# Patient Record
Sex: MALE | Race: OTHER | NOT HISPANIC OR LATINO | URBAN - METROPOLITAN AREA
[De-identification: names, ages, dates, MRNs, and addresses within clinical notes are randomized per-mention and may not be internally consistent; named-entity substitution may affect disease eponyms.]

---

## 2023-09-01 ENCOUNTER — EMERGENCY (EMERGENCY)
Facility: HOSPITAL | Age: 23
LOS: 1 days | Discharge: HOME | End: 2023-09-01
Attending: STUDENT IN AN ORGANIZED HEALTH CARE EDUCATION/TRAINING PROGRAM | Admitting: STUDENT IN AN ORGANIZED HEALTH CARE EDUCATION/TRAINING PROGRAM
Payer: COMMERCIAL

## 2023-09-01 VITALS
HEART RATE: 62 BPM | SYSTOLIC BLOOD PRESSURE: 121 MMHG | HEIGHT: 72 IN | OXYGEN SATURATION: 98 % | DIASTOLIC BLOOD PRESSURE: 78 MMHG | RESPIRATION RATE: 15 BRPM | WEIGHT: 177.91 LBS | TEMPERATURE: 98 F

## 2023-09-01 DIAGNOSIS — L30.1 DYSHIDROSIS [POMPHOLYX]: ICD-10-CM

## 2023-09-01 DIAGNOSIS — Z91.018 ALLERGY TO OTHER FOODS: ICD-10-CM

## 2023-09-01 DIAGNOSIS — R21 RASH AND OTHER NONSPECIFIC SKIN ERUPTION: ICD-10-CM

## 2023-09-01 PROCEDURE — 99284 EMERGENCY DEPT VISIT MOD MDM: CPT

## 2023-09-01 PROCEDURE — 99283 EMERGENCY DEPT VISIT LOW MDM: CPT

## 2023-09-01 NOTE — ED ADULT TRIAGE NOTE - CHIEF COMPLAINT QUOTE
"I was told to come to ER because the infection to my left arm is spreading". Patient denies fever, chills, NVD. patient was given clotrimazole cream, bactrim and valacyclovir.

## 2023-09-01 NOTE — ED PROVIDER NOTE - CLINICAL SUMMARY MEDICAL DECISION MAKING FREE TEXT BOX
Likely dyshidrotic eczema. Low susp of infectious etiology. Pt has no history of chickenpox, rash is not painful, low susp of shingles. Lesion non-ttp, low susp of cellulitis. Does not clinically resemble fungal infection.  Prescribed systemic steroids, derm referral.

## 2023-09-01 NOTE — ED ADULT NURSE NOTE - OBJECTIVE STATEMENT
23y otherwise healthy male c/o rash to L arm that started about a week ago. rash is red, oozing and mildly painful. went to [facility] and was given clotrimazole cream, bactrim and valacyclovir without relief. pt well appearing. denies f/c, CP, SOB, HA, dizzines,s n/v/d. No acute distress noted at this time.

## 2023-09-01 NOTE — ED PROVIDER NOTE - NS ED ROS FT
CONSTITUTIONAL: No fever, no chills, no fatigue  EYES: No eye redness, no visual changes  ENT: No ear pain, no sore throat  CARDIOVASCULAR: No chest pain, no palpitations  RESPIRATORY: No cough, no SOB  GI: No abdominal pain, no nausea, no vomiting, no constipation, no diarrhea  GENITOURINARY: No dysuria, no frequency, no hematuria  MUSCULOSKELETAL: No back pain, no joint pain, no myalgias  SKIN: +rash, no peripheral edema  NEURO: No headache, no confusion    ALL OTHER SYSTEMS NEGATIVE.

## 2023-09-01 NOTE — ED PROVIDER NOTE - PATIENT PORTAL LINK FT
You can access the FollowMyHealth Patient Portal offered by [facility] by registering at the following website: [email or web address]. By joining [employer]’s FollowMyHealth portal, you will also be able to view your health information using other applications (apps) compatible with our system.

## 2023-09-01 NOTE — ED PROVIDER NOTE - OBJECTIVE STATEMENT
24 yo M w no PMH p/w LUE and R hand rash for 2 wks. Rash initially began on R 4th finger, now resolving. Several days after initial rash on hand, he developed an area of erythema on the L arm near elbow approx 3cm in maria d. Area is mildly pruritic, not painful. Small eruption of clear vesicles that progressed to honey-crusted lesions surrounding area, further convalesced to widening erythema over the following week. He visited urgent care 2 days ago where the provider did not know what the rash was but prescribed topical antifungal, acyclovir, and bactrim in attempt to cover all possible infectious pathology. Rash has not improved with medication. Currently approx 10 cm in maria d, non-circumferential. He has not had a fever or chills. No history of similar rashes. No allergies or atypical exposures. Rash not bulls-eye, not rim enhancing.

## 2023-09-01 NOTE — ED PROVIDER NOTE - PHYSICAL EXAMINATION
CONSTITUTIONAL: Non-toxic; in no apparent distress  HEAD: Normocephalic; atraumatic  EYES: No icterus or injection  ENMT: External appears normal  NECK: Supple; normal ROM, no visible mass, symmetric, trachea midline  CARD: Extremities warm and well perfused, normal HR, normal BP  RESP: No resp distress, symmetric chest rise  ABD: No obvious ascites or abd distension  EXT: Grossly normal ROM in all four extremities  SKIN: Warm, dry, + L 4th finger with 2cm maria d area of dry mildly erythematous skin that is non-ttp, + RUE elbow w 10cm maria d area of erythema w peripheral vesicles w clear/honey-crusted fluid, no ttp, no fluctuance, no calor  NEURO: Gait normal, grossly symmetric face, strength intact throughout extremities.

## 2023-09-01 NOTE — ED PROVIDER NOTE - NSFOLLOWUPINSTRUCTIONS_ED_ALL_ED_FT
Follow up with your primary medical doctor as soon as possible.    Return to the emergency department if your symptoms worsen or if you develop new symptoms.  If you have any problems with followup, please call the ED Referral Coordinator at [phone].    In clinical experience, the avoidance of irritants or exacerbating factors is beneficial for most patients with dyshidrotic eczema. General skin care measures aimed at reducing skin irritation and restoring the skin barrier include [31]:    ?Using lukewarm water and mild, synthetic detergent (nonsoap) cleansers to wash hands. Most liquid body cleansers are actually synthetic detergent products with a neutral pH. In contrast, "natural" soaps (eg, Castile soap) have an alkaline pH and tend to be more aggressive detergents.      ?Drying hands thoroughly after washing.      ?Applying emollients (eg, petroleum jelly) immediately after hand drying and as often as possible.      ?Wearing cotton gloves under vinyl or other nonlatex gloves when performing wet work.      ?Removing rings, watches, and bracelets before wet work.      ?Wearing protective gloves in cold weather.      ?Wearing task-specific gloves for frictional exposures (eg, gardening, carpentry).      ?Avoiding cutaneous exposure to irritants (eg, harsh detergents, solvents, hair dyes, acidic foods [eg, citrus fruit]).      In clinical practice, astringent solutions such as aluminum subacetate (Burow's solution) or witch hazel are used for wet, weeping skin. Hands or feet are soaked in the solution for 15 minutes two to four times per day. Alternately, soakings can be replaced by thin, wet dressings through which air will circulate, enhancing the drying effect of astringent solutions. Large bullae may be drained or aspirated using a sterile syringe to reduce pain and prevent spontaneous rupture with risk of local infection. Follow up with your primary medical doctor as soon as possible.  Follow up with dermatology - Call [phone] to schedule an appointment.    Return to the emergency department if your symptoms worsen or if you develop new symptoms.  If you have any problems with followup, please call the ED Referral Coordinator at [phone].    In clinical experience, the avoidance of irritants or exacerbating factors is beneficial for most patients with dyshidrotic eczema. General skin care measures aimed at reducing skin irritation and restoring the skin barrier include [31]:    ?Using lukewarm water and mild, synthetic detergent (nonsoap) cleansers to wash hands. Most liquid body cleansers are actually synthetic detergent products with a neutral pH. In contrast, "natural" soaps (eg, Castile soap) have an alkaline pH and tend to be more aggressive detergents.      ?Drying hands thoroughly after washing.      ?Applying emollients (eg, petroleum jelly) immediately after hand drying and as often as possible.      ?Wearing cotton gloves under vinyl or other nonlatex gloves when performing wet work.      ?Removing rings, watches, and bracelets before wet work.      ?Wearing protective gloves in cold weather.      ?Wearing task-specific gloves for frictional exposures (eg, gardening, carpentry).      ?Avoiding cutaneous exposure to irritants (eg, harsh detergents, solvents, hair dyes, acidic foods [eg, citrus fruit]).      In clinical practice, astringent solutions such as aluminum subacetate (Burow's solution) or witch hazel are used for wet, weeping skin. Hands or feet are soaked in the solution for 15 minutes two to four times per day. Alternately, soakings can be replaced by thin, wet dressings through which air will circulate, enhancing the drying effect of astringent solutions. Large bullae may be drained or aspirated using a sterile syringe to reduce pain and prevent spontaneous rupture with risk of local infection.

## 2024-04-01 NOTE — ED ADULT TRIAGE NOTE - ARRIVAL FROM
HISTORY & PHYSICAL    Subjective   Chief complaint: Jesus Lantigua is a 81 y.o. male who is being seen and evaluated for multiple medical problems.  Patient admitted to SNF for therapy due to weakness after recent hospitalization.    HPI:  HPI  Patient was admitted to the hospital for UTI.  Patient was followed by urology.  Patient underwent cystoscopy, bilateral retrograde pyelogram on 3/25/2024.  Patient is to follow-up outpatient with urology and is to continue with Ashley catheter.  Patient had a concern for acute convulsion in hospital and was followed by neuro, patient does have a history of Parkinson's disease.  Neurology recommended continued medications as currently ordered.  Patient was hemodynamically stable to discharge to SNF for continued medical management and therapy.  Patient was seen and examined at bedside, appears to be in no acute distress.  Afebrile.  Patient is tolerating antibiotic for UTI without adverse effects.  Past Medical History:   Diagnosis Date    Atherosclerotic heart disease of native coronary artery without angina pectoris     Chronic kidney disease, stage 1     Cognitive communication deficit     Dementia (CMS/HCC)     Essential (primary) hypertension     Hyperlipidemia     Parkinson's disease (CMS/[facility])     Type 2 diabetes mellitus without complication (CMS/[facility])     Urinary tract infection        Past Surgical History:   Procedure Laterality Date    APPENDECTOMY  11/14/2016    Appendectomy    OTHER SURGICAL HISTORY  11/14/2016    Cath Stent Placement Number Of Stents Placed:       Family History   Problem Relation Name Age of Onset    No Known Problems Mother      No Known Problems Father         Social History     Socioeconomic History    Marital status:      Spouse name: Not on file    Number of children: Not on file    Years of education: Not on file    Highest education level: Not on file   Occupational History    Not on file   Tobacco Use    Smoking status: Not on file     Smokeless tobacco: Not on file   Substance and Sexual Activity    Alcohol use: Not on file    Drug use: Not on file    Sexual activity: Not on file   Other Topics Concern    Not on file   Social History Narrative    Not on file     Social Determinants of Health     Financial Resource Strain: Not on file   Food Insecurity: Not on file   Transportation Needs: Not on file   Physical Activity: Not on file   Stress: Not on file   Social Connections: Not on file   Intimate Partner Violence: Not on file   Housing Stability: Not on file       Vital signs: 120/63, 98.1, 9119 blood sugar 298, 96%    Objective   Physical Exam  Vitals reviewed.   Constitutional:       Appearance: Normal appearance.   HENT:      Head: Normocephalic and atraumatic.   Cardiovascular:      Rate and Rhythm: Normal rate and regular rhythm.   Pulmonary:      Effort: Pulmonary effort is normal.      Breath sounds: Normal breath sounds.   Abdominal:      General: Bowel sounds are normal.      Palpations: Abdomen is soft.   Musculoskeletal:      Cervical back: Neck supple.   Skin:     General: Skin is warm and dry.   Neurological:      General: No focal deficit present.      Mental Status: He is alert.   Psychiatric:         Mood and Affect: Mood normal.         Behavior: Behavior is cooperative.         Assessment/Plan   Problem List Items Addressed This Visit       Essential (primary) hypertension     Monitor blood pressure  Metoprolol         Parkinson's disease (CMS/[facility])     Carbidopa levodopa  Monitor         Type 2 diabetes mellitus without complication (CMS/[facility])     Glucoscans with sliding scale insulin  Insulin  Consistent carb/liberal diabetic diet         UTI (urinary tract infection) - Primary     Continue cephalexin until complete  Follow-up with urology outpatient         Seizure disorder (CMS/[facility])     Phenobarbital  Monitor for seizure activity         Hematuria     Follow-up with urology outpatient  Ashley  Status post cystoscopy,  bilateral retrograde pyelogram          Hospital records reviewed  Medications, treatments, and labs reviewed  Continue medications and treatments as listed in EMR  Discussed with nursing and therapy      Scribe Attestation  I, Fiona Mauro   attest that this documentation has been prepared under the direction and in the presence of Steven Muir MD    Provider Attestation - Scribe documentation  All medical record entries made by the Scribe were at my direction and personally dictated by me. I have reviewed the chart and agree that the record accurately reflects my personal performance of the history, physical exam, discussion and plan.   Steven Muir MD   Home

## 2024-12-18 NOTE — ED ADULT TRIAGE NOTE - SPO2 (%)
The MRI scan done to assess the asymmetry in the hearing was reviewed.  No abnormalities in the central nervous system were noted by radiology and the hearing and balance nerve complex was normal.  The overlap paranasal sinuses were also clear.  The hearing loss itself should be monitored by yearly exams and amplification of the deficient frequencies with a hearing aid as a treatment option.  To look at that further contacting the office for hearing aid evaluation would be advised.
98

## 2024-12-29 NOTE — ED ADULT TRIAGE NOTE - WEIGHT IN KG
ACUTE URI   Salt water gargles 4-6 times a day to relieve sore throat. ¼ tsp of salt with 4 oz of warm water or gargle with non alcohol-based mouth wash (i.e Listerine, Crest) per pkg instructions.   May use any over-the-counter throat lozenge per package instructions for relief of throat pain. Consider placing 1-2 lozenges in a 1 liter bottle of water; sip periodically throughout the day (keeps throat tissues moist; soothes throat tissue discomfort)  Speak in a whisper to minimize vocal cord straain  Cool mist humidified air decreases nasal congestion, loosens/thins mucus and provides moisture to throat tissue (humidifier/vaporizer)  Clean humidifier/vaporizer per 's instructions  Increase oral fluid intake (2-3 liters a day) to promote hydration and sinus drainage.  Avoid alcohol, caffeine and limit dairy products to avoid dehydration and worsening of mucus production.  Avoid sharing eating and drinking utensils  May take over-the-counter immune support product per pkg instructions (Vitamin C, Elderberry, Zinc)   May elevate head on 2-3 pillows while reclining and at night to promote sinus drainage and decreases night cough.  Limit diet to clear liquids or soft solids if nauseated, until able to tolerate heavier meals.   Avoid spicy/salty, sharp foods (chips) , citrus or carbonated beverages.  May take over-the-counter Acetaminophen (Tylenol) or Ibuprofen (Advil, Motrin with food) for fever or pain per package instructions.  Cough into elbow  2 teaspoons of honey or over the counter cough medication (i.e Delsym) per pkg instructions at bedtime as needed for cough that keeps you awake  Frequent Hand washing  Discard toothbrush and obtain new one after symptoms resolve  If symptoms not improving within 1 wk follow up with PCP  If symptoms worsen- persistent fever (100.5 degrees or greater), vomiting, nausea, drooling, stiffness of neck, difficulty swallowing, persistent cough with thick yellow mucus,  Called and spoke with pt wife relay result note, wife stated she will tel pt about this. Stated to call back office if any further questions.   wheezing, tightness in chest, upper/lower back pain, or difficulty breathing GO IMMEDIATELY TO ER     Pt counseled on illness process, medication/s to use & when to seek emergency care vs PCP follow up.  Pt verbalizes understanding of  discharge instructions. No questions/concerns regarding this visit   80.7